# Patient Record
Sex: FEMALE | Employment: FULL TIME | URBAN - METROPOLITAN AREA
[De-identification: names, ages, dates, MRNs, and addresses within clinical notes are randomized per-mention and may not be internally consistent; named-entity substitution may affect disease eponyms.]

---

## 2023-06-28 ENCOUNTER — HOSPITAL ENCOUNTER (OUTPATIENT)
Dept: NEUROLOGY | Age: 68
Discharge: HOME | End: 2023-06-28
Payer: COMMERCIAL

## 2023-06-28 DIAGNOSIS — G56.03 CARPAL TUNNEL SYNDROME, BILATERAL: ICD-10-CM

## 2023-06-28 PROCEDURE — 95886 MUSC TEST DONE W/N TEST COMP: CPT

## 2023-06-28 PROCEDURE — 95910 NRV CNDJ TEST 7-8 STUDIES: CPT

## 2023-08-01 LAB
ALANINE AMINOTRANSFERASE (SGPT) (U/L) IN SER/PLAS: 24 U/L (ref 7–45)
ALBUMIN (G/DL) IN SER/PLAS: 4.6 G/DL (ref 3.4–5)
ALKALINE PHOSPHATASE (U/L) IN SER/PLAS: 83 U/L (ref 33–136)
ANION GAP IN SER/PLAS: 12 MMOL/L (ref 10–20)
ASPARTATE AMINOTRANSFERASE (SGOT) (U/L) IN SER/PLAS: 24 U/L (ref 9–39)
BASOPHILS (10*3/UL) IN BLOOD BY AUTOMATED COUNT: 0.07 X10E9/L (ref 0–0.1)
BASOPHILS/100 LEUKOCYTES IN BLOOD BY AUTOMATED COUNT: 1.2 % (ref 0–2)
BILIRUBIN TOTAL (MG/DL) IN SER/PLAS: 0.5 MG/DL (ref 0–1.2)
C REACTIVE PROTEIN (MG/L) IN SER/PLAS: 0.15 MG/DL
CALCIDIOL (25 OH VITAMIN D3) (NG/ML) IN SER/PLAS: 26 NG/ML
CALCIUM (MG/DL) IN SER/PLAS: 9.5 MG/DL (ref 8.6–10.3)
CARBON DIOXIDE, TOTAL (MMOL/L) IN SER/PLAS: 26 MMOL/L (ref 21–32)
CHLORIDE (MMOL/L) IN SER/PLAS: 105 MMOL/L (ref 98–107)
CREATININE (MG/DL) IN SER/PLAS: 0.71 MG/DL (ref 0.5–1.05)
EOSINOPHILS (10*3/UL) IN BLOOD BY AUTOMATED COUNT: 0.15 X10E9/L (ref 0–0.7)
EOSINOPHILS/100 LEUKOCYTES IN BLOOD BY AUTOMATED COUNT: 2.6 % (ref 0–6)
ERYTHROCYTE DISTRIBUTION WIDTH (RATIO) BY AUTOMATED COUNT: 13.2 % (ref 11.5–14.5)
ERYTHROCYTE MEAN CORPUSCULAR HEMOGLOBIN CONCENTRATION (G/DL) BY AUTOMATED: 32.5 G/DL (ref 32–36)
ERYTHROCYTE MEAN CORPUSCULAR VOLUME (FL) BY AUTOMATED COUNT: 91 FL (ref 80–100)
ERYTHROCYTES (10*6/UL) IN BLOOD BY AUTOMATED COUNT: 4.91 X10E12/L (ref 4–5.2)
GFR FEMALE: >90 ML/MIN/1.73M2
GLUCOSE (MG/DL) IN SER/PLAS: 98 MG/DL (ref 74–99)
HEMATOCRIT (%) IN BLOOD BY AUTOMATED COUNT: 44.9 % (ref 36–46)
HEMOGLOBIN (G/DL) IN BLOOD: 14.6 G/DL (ref 12–16)
IMMATURE GRANULOCYTES/100 LEUKOCYTES IN BLOOD BY AUTOMATED COUNT: 0.2 % (ref 0–0.9)
LEUKOCYTES (10*3/UL) IN BLOOD BY AUTOMATED COUNT: 5.7 X10E9/L (ref 4.4–11.3)
LYMPHOCYTES (10*3/UL) IN BLOOD BY AUTOMATED COUNT: 1.86 X10E9/L (ref 1.2–4.8)
LYMPHOCYTES/100 LEUKOCYTES IN BLOOD BY AUTOMATED COUNT: 32.7 % (ref 13–44)
MONOCYTES (10*3/UL) IN BLOOD BY AUTOMATED COUNT: 0.51 X10E9/L (ref 0.1–1)
MONOCYTES/100 LEUKOCYTES IN BLOOD BY AUTOMATED COUNT: 9 % (ref 2–10)
NEUTROPHILS (10*3/UL) IN BLOOD BY AUTOMATED COUNT: 3.09 X10E9/L (ref 1.2–7.7)
NEUTROPHILS/100 LEUKOCYTES IN BLOOD BY AUTOMATED COUNT: 54.3 % (ref 40–80)
PLATELETS (10*3/UL) IN BLOOD AUTOMATED COUNT: 199 X10E9/L (ref 150–450)
POTASSIUM (MMOL/L) IN SER/PLAS: 3.9 MMOL/L (ref 3.5–5.3)
PROTEIN TOTAL: 7.1 G/DL (ref 6.4–8.2)
SEDIMENTATION RATE, ERYTHROCYTE: 2 MM/H (ref 0–30)
SODIUM (MMOL/L) IN SER/PLAS: 139 MMOL/L (ref 136–145)
THYROTROPIN (MIU/L) IN SER/PLAS BY DETECTION LIMIT <= 0.05 MIU/L: 1.84 MIU/L (ref 0.44–3.98)
UREA NITROGEN (MG/DL) IN SER/PLAS: 12 MG/DL (ref 6–23)

## 2023-08-02 LAB
CITRULLINE ANTIBODY, IGG: <1 U/ML
RHEUMATOID FACTOR (IU/ML) IN SERUM OR PLASMA: 15 IU/ML (ref 0–15)

## 2023-08-07 LAB — HLAB27 TYPING: NEGATIVE

## 2023-10-20 PROBLEM — G89.29 CHRONIC PAIN OF TOE: Status: ACTIVE | Noted: 2023-10-20

## 2023-10-20 PROBLEM — S82.133A: Status: ACTIVE | Noted: 2023-10-20

## 2023-10-20 PROBLEM — S82.132A LEFT MEDIAL TIBIAL PLATEAU FRACTURE: Status: ACTIVE | Noted: 2023-10-20

## 2023-10-20 PROBLEM — G47.00 INSOMNIA: Status: ACTIVE | Noted: 2023-10-20

## 2023-10-20 PROBLEM — M79.7 FIBROMYALGIA: Status: ACTIVE | Noted: 2023-10-20

## 2023-10-20 PROBLEM — Z86.69 HISTORY OF CARPAL TUNNEL SYNDROME: Status: ACTIVE | Noted: 2023-10-20

## 2023-10-20 PROBLEM — G25.81 RESTLESS LEGS SYNDROME: Status: ACTIVE | Noted: 2023-10-20

## 2023-10-20 PROBLEM — S82.131A RIGHT MEDIAL TIBIAL PLATEAU FRACTURE: Status: ACTIVE | Noted: 2023-10-20

## 2023-10-20 PROBLEM — M18.11 LOCALIZED PRIMARY OSTEOARTHRITIS OF CARPOMETACARPAL JOINT OF RIGHT THUMB: Status: ACTIVE | Noted: 2023-10-20

## 2023-10-20 PROBLEM — M54.50 CHRONIC LOW BACK PAIN: Status: ACTIVE | Noted: 2023-10-20

## 2023-10-20 PROBLEM — S83.249A MMT (MEDIAL MENISCUS TEAR): Status: ACTIVE | Noted: 2023-10-20

## 2023-10-20 PROBLEM — M15.9 POLYARTICULAR OSTEOARTHRITIS: Status: ACTIVE | Noted: 2023-10-20

## 2023-10-20 PROBLEM — G47.33 OBSTRUCTIVE SLEEP APNEA (ADULT) (PEDIATRIC): Status: ACTIVE | Noted: 2023-10-20

## 2023-10-20 PROBLEM — G89.29 CHRONIC LOW BACK PAIN: Status: ACTIVE | Noted: 2023-10-20

## 2023-10-20 PROBLEM — M23.90 INTERNAL DERANGEMENT OF KNEE: Status: ACTIVE | Noted: 2023-10-20

## 2023-10-20 PROBLEM — M79.676 CHRONIC PAIN OF TOE: Status: ACTIVE | Noted: 2023-10-20

## 2023-10-20 RX ORDER — LOSARTAN POTASSIUM 25 MG/1
1 TABLET ORAL EVERY MORNING
COMMUNITY

## 2023-10-20 RX ORDER — DULOXETIN HYDROCHLORIDE 30 MG/1
1 CAPSULE, DELAYED RELEASE ORAL DAILY
COMMUNITY

## 2023-10-20 RX ORDER — SIMVASTATIN 80 MG/1
TABLET, FILM COATED ORAL
COMMUNITY

## 2023-10-20 RX ORDER — RIZATRIPTAN BENZOATE 10 MG/1
1 TABLET ORAL ONCE AS NEEDED
COMMUNITY

## 2023-10-23 ENCOUNTER — OFFICE VISIT (OUTPATIENT)
Dept: OBSTETRICS AND GYNECOLOGY | Facility: CLINIC | Age: 68
End: 2023-10-23
Payer: COMMERCIAL

## 2023-10-23 VITALS
DIASTOLIC BLOOD PRESSURE: 74 MMHG | WEIGHT: 210 LBS | HEIGHT: 66 IN | SYSTOLIC BLOOD PRESSURE: 148 MMHG | BODY MASS INDEX: 33.75 KG/M2

## 2023-10-23 DIAGNOSIS — Z12.31 BREAST CANCER SCREENING BY MAMMOGRAM: ICD-10-CM

## 2023-10-23 DIAGNOSIS — N39.41 URGE URINARY INCONTINENCE: ICD-10-CM

## 2023-10-23 DIAGNOSIS — Z01.419 WELL WOMAN EXAM WITH ROUTINE GYNECOLOGICAL EXAM: Primary | ICD-10-CM

## 2023-10-23 DIAGNOSIS — N81.6 POP-Q STAGE 2 RECTOCELE: ICD-10-CM

## 2023-10-23 DIAGNOSIS — Z12.4 CERVICAL CANCER SCREENING: ICD-10-CM

## 2023-10-23 PROCEDURE — 1160F RVW MEDS BY RX/DR IN RCRD: CPT | Performed by: NURSE PRACTITIONER

## 2023-10-23 PROCEDURE — 88175 CYTOPATH C/V AUTO FLUID REDO: CPT

## 2023-10-23 PROCEDURE — 99387 INIT PM E/M NEW PAT 65+ YRS: CPT | Performed by: NURSE PRACTITIONER

## 2023-10-23 PROCEDURE — 1159F MED LIST DOCD IN RCRD: CPT | Performed by: NURSE PRACTITIONER

## 2023-10-23 PROCEDURE — 87624 HPV HI-RISK TYP POOLED RSLT: CPT

## 2023-10-23 PROCEDURE — 1126F AMNT PAIN NOTED NONE PRSNT: CPT | Performed by: NURSE PRACTITIONER

## 2023-10-23 ASSESSMENT — PAIN SCALES - GENERAL: PAINLEVEL: 0-NO PAIN

## 2023-10-23 NOTE — PATIENT INSTRUCTIONS
I can provide a general pelvic floor physical therapy exercise plan, but please note that it's important to consult a qualified physical therapist for an individualized plan. Here's a basic plan for managing stress incontinence:    1. **Kegel Exercises**: These help strengthen the pelvic floor muscles.     - Squeeze the pelvic floor muscles (the ones you use to stop urination) for 5 seconds.     - Release and relax for 5 seconds.     - Repeat this cycle 10-15 times, 2-3 times a day.    2. **Bridge Exercises**:     - Lie on your back with knees bent and feet flat on the floor.     - Lift your hips off the ground, engaging your glutes and pelvic floor.     - Hold for 5 seconds, then lower.     - Repeat 10-15 times, 2-3 times a day.    3. **Squats**:     - Stand with your feet shoulder-width apart.     - Lower your body as if sitting in a chair, keeping your back straight.     - Perform 2-3 sets of 10-15 squats, 2-3 times a day.    4. **Core Strengthening**:     - Gentle core exercises like planks and pelvic tilts can help support the pelvic floor.    5. **Diaphragmatic Breathing**:     - Practice deep breathing to help relax and engage the pelvic floor.    6. **Biofeedback**:     - Consider using biofeedback devices or consulting a therapist for guidance.    7. **Maintain a Healthy Lifestyle**:     - Stay hydrated but avoid excessive caffeine and alcohol.     - Maintain a healthy weight to reduce pressure on the pelvic floor.     - Avoid heavy lifting and high-impact exercises.    Remember, it's crucial to have a personalized assessment and guidance from a pelvic floor physical therapist who can tailor the exercises to your specific needs and monitor your progress. These exercises should be performed consistently over time to see improvements in stress incontinence symptoms.   DISPLAY PLAN FREE TEXT

## 2023-10-23 NOTE — PROGRESS NOTES
Antonella Pires is a 68 y.o. who presents today for her annual gynecologic exam with complaints or urge incontinence.     OB/GYN:  OB History    No obstetric history on file.       - Postmenopausal  - Postmenopausal bleeding no  - Current vaginal/vulvar symptoms: no  - Current breast symptoms: no    Sexual activity:  - Leakage, pain or dryness with intercourse? no  - Concern for STI testing? No    Urogyn history:   - Leakage of urine or stool? Yes, leakage with urge, 1-2x/wk   - Feeling of bulge or prolapse? Yes, sometimes feels stool pocketing, will manually disimpact stool    Personal and Family History:  History reviewed. No pertinent past medical history.  History reviewed. No pertinent surgical history.      has no history on file for tobacco use, alcohol use, and drug use.  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   Genitourinary:      Vulva, bladder, rectum and urethral meatus normal.      No vaginal discharge.      No vaginal prolapse present.     No vaginal atrophy present.       Right Adnexa: not tender, not full and no mass present.     Left Adnexa: not tender, not full and no mass present.     Uterus is not enlarged, fixed or tender.   POP-Q measurements were:      Aa: -3, Ba: -3, C: -8     gH: 4, pB: 3, TVL: 10     Ap: 0, Bp: 0, D:  Breasts:     Right: Normal.      Left: Normal.   Pulmonary:      Effort: Pulmonary effort is normal.   Neurological:      General: No focal deficit present.      Mental Status: She is alert and oriented to person, place, and time.   Psychiatric:         Mood and Affect: Mood normal.         Behavior: Behavior normal.         Thought Content: Thought content normal.         Judgment: Judgment normal.   Vitals reviewed.          Assessment/Plan:   Diagnoses and all orders for this visit: Well woman exam with routine gynecological exam    Breast cancer screening by mammogram  -      BI mammo bilateral screening tomosynthesis; Future  Cervical cancer screening  -     THINPREP PAP TEST  Urge urinary incontinence  -  Recommended lifestyle management and being mindful of total fluid intake  -  List of pelvic floor exercises provided to patient as she was not interested in PFPT  POP-Q stage 2 rectocele  - Minimally symptomatic, demonstrated splinting for her occasional pocketing of stool      Follow up annually or sooner if needed

## 2023-11-14 LAB
CYTOLOGY CMNT CVX/VAG CYTO-IMP: NORMAL
HPV HR 12 DNA GENITAL QL NAA+PROBE: NEGATIVE
HPV HR GENOTYPES PNL CVX NAA+PROBE: NEGATIVE
HPV16 DNA SPEC QL NAA+PROBE: NEGATIVE
HPV18 DNA SPEC QL NAA+PROBE: NEGATIVE
LAB AP HPV GENOTYPE QUESTION: YES
LAB AP HPV HR: NORMAL
LABORATORY COMMENT REPORT: NORMAL
PATH REPORT.TOTAL CANCER: NORMAL

## 2023-12-08 ENCOUNTER — APPOINTMENT (OUTPATIENT)
Dept: RADIOLOGY | Facility: CLINIC | Age: 68
End: 2023-12-08
Payer: COMMERCIAL

## 2023-12-14 ENCOUNTER — APPOINTMENT (OUTPATIENT)
Dept: RADIOLOGY | Facility: CLINIC | Age: 68
End: 2023-12-14
Payer: COMMERCIAL

## 2023-12-14 ENCOUNTER — ANCILLARY PROCEDURE (OUTPATIENT)
Dept: RADIOLOGY | Facility: CLINIC | Age: 68
End: 2023-12-14
Payer: COMMERCIAL

## 2023-12-14 DIAGNOSIS — Z12.31 BREAST CANCER SCREENING BY MAMMOGRAM: ICD-10-CM

## 2023-12-14 PROCEDURE — 77067 SCR MAMMO BI INCL CAD: CPT

## 2023-12-14 PROCEDURE — 77067 SCR MAMMO BI INCL CAD: CPT | Performed by: STUDENT IN AN ORGANIZED HEALTH CARE EDUCATION/TRAINING PROGRAM

## 2023-12-14 PROCEDURE — 77063 BREAST TOMOSYNTHESIS BI: CPT | Performed by: STUDENT IN AN ORGANIZED HEALTH CARE EDUCATION/TRAINING PROGRAM

## 2023-12-15 ENCOUNTER — HOSPITAL ENCOUNTER (OUTPATIENT)
Dept: RADIOLOGY | Facility: EXTERNAL LOCATION | Age: 68
Discharge: HOME | End: 2023-12-15